# Patient Record
Sex: FEMALE | Race: WHITE | NOT HISPANIC OR LATINO | Employment: FULL TIME | URBAN - METROPOLITAN AREA
[De-identification: names, ages, dates, MRNs, and addresses within clinical notes are randomized per-mention and may not be internally consistent; named-entity substitution may affect disease eponyms.]

---

## 2022-02-28 ENCOUNTER — NON-PROVIDER VISIT (OUTPATIENT)
Dept: OCCUPATIONAL MEDICINE | Facility: CLINIC | Age: 27
End: 2022-02-28

## 2022-02-28 ENCOUNTER — EH NON-PROVIDER (OUTPATIENT)
Dept: OCCUPATIONAL MEDICINE | Facility: CLINIC | Age: 27
End: 2022-02-28

## 2022-02-28 PROCEDURE — 94375 RESPIRATORY FLOW VOLUME LOOP: CPT | Performed by: NURSE PRACTITIONER

## 2022-11-18 ENCOUNTER — OFFICE VISIT (OUTPATIENT)
Dept: URGENT CARE | Facility: PHYSICIAN GROUP | Age: 27
End: 2022-11-18
Payer: COMMERCIAL

## 2022-11-18 VITALS
OXYGEN SATURATION: 97 % | DIASTOLIC BLOOD PRESSURE: 78 MMHG | SYSTOLIC BLOOD PRESSURE: 126 MMHG | HEART RATE: 72 BPM | TEMPERATURE: 97.2 F | RESPIRATION RATE: 17 BRPM | WEIGHT: 290 LBS | HEIGHT: 69 IN | BODY MASS INDEX: 42.95 KG/M2

## 2022-11-18 DIAGNOSIS — R82.90 ABNORMAL URINE ODOR: ICD-10-CM

## 2022-11-18 PROCEDURE — 99203 OFFICE O/P NEW LOW 30 MIN: CPT | Performed by: FAMILY MEDICINE

## 2022-11-18 RX ORDER — BUPROPION HYDROCHLORIDE 300 MG/1
300 TABLET ORAL EVERY MORNING
COMMUNITY
Start: 2022-11-09

## 2022-11-19 NOTE — PROGRESS NOTES
"Subjective     Megan Kim is a 27 y.o. female who presents with UTI (Uti , strong smelling urine x 2 months )            2 months waxing and waning strong urine smell.  Not associated with any particular foods or diet change.  No pain with urination. Intermittent flank pain/resolves with water. No fever. No hematuria. PMH pyelonephritis.  No vaginal symptoms.  No other aggravating or alleviating factors.      Review of Systems   Constitutional:  Negative for malaise/fatigue and weight loss.   Eyes:  Negative for discharge and redness.   Gastrointestinal:  Negative for nausea and vomiting.   Musculoskeletal:  Negative for joint pain and myalgias.   Skin:  Negative for itching and rash.            Objective     /78 (BP Location: Left arm, Patient Position: Sitting, BP Cuff Size: Adult)   Pulse 72   Temp 36.2 °C (97.2 °F) (Temporal)   Resp 17   Ht 1.753 m (5' 9\")   Wt (!) 132 kg (290 lb)   SpO2 97%   BMI 42.83 kg/m²      Physical Exam  Constitutional:       General: She is not in acute distress.     Appearance: She is well-developed.   HENT:      Head: Normocephalic and atraumatic.   Eyes:      Conjunctiva/sclera: Conjunctivae normal.   Cardiovascular:      Rate and Rhythm: Normal rate and regular rhythm.      Heart sounds: Normal heart sounds. No murmur heard.  Pulmonary:      Effort: Pulmonary effort is normal.      Breath sounds: Normal breath sounds. No wheezing.   Abdominal:      Tenderness: There is no abdominal tenderness. There is no right CVA tenderness or left CVA tenderness.   Skin:     General: Skin is warm and dry.      Findings: No rash.   Neurological:      Mental Status: She is alert.                           Assessment & Plan     UA reviewed: normal      1. Abnormal urine odor          Differential diagnosis, natural history, supportive care, and indications for immediate follow-up discussed at length.     Unclear etiology. No evidence of infection. F/u primary care.         "

## 2022-11-23 ASSESSMENT — ENCOUNTER SYMPTOMS
MYALGIAS: 0
EYE REDNESS: 0
EYE DISCHARGE: 0
VOMITING: 0
NAUSEA: 0
WEIGHT LOSS: 0